# Patient Record
Sex: FEMALE | Race: WHITE | NOT HISPANIC OR LATINO | Employment: UNEMPLOYED | URBAN - METROPOLITAN AREA
[De-identification: names, ages, dates, MRNs, and addresses within clinical notes are randomized per-mention and may not be internally consistent; named-entity substitution may affect disease eponyms.]

---
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
## 2025-07-02 ENCOUNTER — OFFICE VISIT (OUTPATIENT)
Dept: FAMILY MEDICINE CLINIC | Facility: CLINIC | Age: 20
End: 2025-07-02
Payer: COMMERCIAL

## 2025-07-02 VITALS
HEIGHT: 66 IN | HEART RATE: 64 BPM | WEIGHT: 117.4 LBS | DIASTOLIC BLOOD PRESSURE: 60 MMHG | SYSTOLIC BLOOD PRESSURE: 104 MMHG | OXYGEN SATURATION: 97 % | BODY MASS INDEX: 18.87 KG/M2

## 2025-07-02 DIAGNOSIS — R42 DIZZINESS: ICD-10-CM

## 2025-07-02 DIAGNOSIS — R55 SYNCOPE, UNSPECIFIED SYNCOPE TYPE: Primary | ICD-10-CM

## 2025-07-02 DIAGNOSIS — F41.9 ANXIETY: ICD-10-CM

## 2025-07-02 PROCEDURE — 93000 ELECTROCARDIOGRAM COMPLETE: CPT | Performed by: FAMILY MEDICINE

## 2025-07-02 PROCEDURE — 99204 OFFICE O/P NEW MOD 45 MIN: CPT | Performed by: FAMILY MEDICINE

## 2025-07-02 RX ORDER — LEVONORGESTREL AND ETHINYL ESTRADIOL 0.15-0.03
1 KIT ORAL DAILY
COMMUNITY
Start: 2025-05-14

## 2025-07-02 NOTE — PROGRESS NOTES
Name: Enriqueta Krishna      : 2005      MRN: [id number]  Encounter Provider: Karoline Mallory DO  Encounter Date: 2025   Encounter department: [facility]  :  Assessment & Plan  Syncope, unspecified syncope type  X 1 episode X 2 mos ago  And no problems since  Ecg - Sinus bradycardia at 50 bpm  Normal axis  No st seg elevation/depression  Normal Qtc interval  Sinus arrythmia    Orders:    Echo complete w/ contrast if indicated; Future    CBC and differential; Future    Comprehensive metabolic panel; Future    Iron, TIBC and Ferritin Panel; Future    TSH, 3rd generation; Future    hCG, quantitative; Future    POCT ECG    Dizziness  Will begin workup with labs  And echo  Ecg today in office.  Urge regular food intake every 2-3 hours  As well as increase po fluid intake including electrolytes - g-zero or propel.  Add salty foods  Anxiety may be contributing - will have pt return to discuss in more detail - pt in agreement.  No recent infections that pt is aware of.    Denies unhealthy relationship with food.    Patient Instructions   Echo -     Labs       Compression socks - go to [facility]  Wear while working or if prolonged standing    Add salt to food and salty foods  Electrolyte drinks - 1-2x/day    Return to review above and discuss anxiety    Eat small frequent meals - every 2-3 hours - do NOT skip meals.                 Anxiety  Return to discuss  NATHEN-7 - 19  Note - pt withdrew from college - was at [employer] in [place]  Living at home with parents.                History of Present Illness   HPI  Pt is a new (former) patient of practice.  Has 2 concerns:  Notices that she feels dizzy and legs feel weak when working/with prologned standing - for the past 2 mos.  Anxiety    Review of Systems   Constitutional:  Negative for chills, diaphoresis, fatigue, fever and unexpected weight change.   Eyes:  Negative for visual disturbance.   Respiratory:  Negative for cough,  "shortness of breath and wheezing.    Cardiovascular:  Negative for chest pain, palpitations and leg swelling.        Feels dizzy while working - for 8 hours without a break    When exercising - pt may get dizzy - so she rests until she feels better     Gastrointestinal:  Negative for abdominal pain, blood in stool, constipation, diarrhea, nausea and vomiting.        May forget about when it is time to eat  And so misses meals at times  Due to being busy    Drinking about 64 oz water/day  No added electrolytes.     Genitourinary:  Negative for menstrual problem.        FDLMP -   2 days ago  Was on ocp but stopped taking the past month - to see if she would feel less dizzy  She will be restarting next cycle  Denies chance of pregnancy     Neurological:  Positive for dizziness and syncope. Negative for seizures.        Syncope X 1 at 0100 - when in the kitchen making toast  Felt the prodrome of syncope.  Witnessed by her friend on the phone.  Did not seek care after this   Psychiatric/Behavioral:  Negative for dysphoric mood, self-injury, sleep disturbance and suicidal ideas. The patient is nervous/anxious.         Denies SI       Objective   /60   Pulse 64   Ht 5' 5.83\" (1.672 m)   Wt 53.3 kg (117 lb 6.4 oz)   LMP 06/30/2025   SpO2 97%   BMI 19.05 kg/m²      Physical Exam  Vitals and nursing note reviewed.   Constitutional:       General: She is not in acute distress.     Appearance: Normal appearance. She is not ill-appearing or toxic-appearing.     Cardiovascular:      Rate and Rhythm: Normal rate and regular rhythm.      Pulses: Normal pulses.      Heart sounds: Normal heart sounds. No murmur heard.  Pulmonary:      Effort: Pulmonary effort is normal. No respiratory distress.      Breath sounds: Normal breath sounds. No wheezing, rhonchi or rales.   Abdominal:      General: There is no distension.      Palpations: Abdomen is soft. There is no mass.      Tenderness: There is no abdominal tenderness. " There is no guarding or rebound.     Musculoskeletal:      Right lower leg: No edema.      Left lower leg: No edema.     Skin:     General: Skin is warm.      Coloration: Skin is not jaundiced.     Neurological:      General: No focal deficit present.      Mental Status: She is alert and oriented to person, place, and time.     Psychiatric:         Mood and Affect: Mood normal.         Behavior: Behavior normal.         Thought Content: Thought content normal.         Judgment: Judgment normal.

## 2025-07-02 NOTE — PATIENT INSTRUCTIONS
Echo -     Labs       Compression socks - go to [employer]  Wear while working or if prolonged standing    Add salt to food and salty foods  Electrolyte drinks - 1-2x/day    Return to review above and discuss anxiety    Eat small frequent meals - every 2-3 hours - do NOT skip meals.

## 2025-08-05 ENCOUNTER — TELEPHONE (OUTPATIENT)
Dept: FAMILY MEDICINE CLINIC | Facility: CLINIC | Age: 20
End: 2025-08-05

## 2025-08-06 ENCOUNTER — OFFICE VISIT (OUTPATIENT)
Dept: FAMILY MEDICINE CLINIC | Facility: CLINIC | Age: 20
End: 2025-08-06
Payer: COMMERCIAL

## 2025-08-06 VITALS
OXYGEN SATURATION: 99 % | DIASTOLIC BLOOD PRESSURE: 70 MMHG | HEART RATE: 66 BPM | SYSTOLIC BLOOD PRESSURE: 110 MMHG | WEIGHT: 115.2 LBS | BODY MASS INDEX: 18.69 KG/M2

## 2025-08-06 DIAGNOSIS — F41.9 ANXIETY: Primary | ICD-10-CM

## 2025-08-06 PROCEDURE — 99214 OFFICE O/P EST MOD 30 MIN: CPT | Performed by: FAMILY MEDICINE

## 2025-08-06 RX ORDER — SERTRALINE HYDROCHLORIDE 25 MG/1
25 TABLET, FILM COATED ORAL DAILY
Qty: 30 TABLET | Refills: 1 | Status: SHIPPED | OUTPATIENT
Start: 2025-08-06 | End: 2026-02-02